# Patient Record
Sex: MALE | Race: WHITE
[De-identification: names, ages, dates, MRNs, and addresses within clinical notes are randomized per-mention and may not be internally consistent; named-entity substitution may affect disease eponyms.]

---

## 2019-01-15 ENCOUNTER — HOSPITAL ENCOUNTER (OUTPATIENT)
Dept: HOSPITAL 80 - FIMAGING | Age: 59
End: 2019-01-15
Attending: ORTHOPAEDIC SURGERY
Payer: COMMERCIAL

## 2019-01-15 DIAGNOSIS — M17.11: ICD-10-CM

## 2019-01-15 DIAGNOSIS — Z01.818: Primary | ICD-10-CM

## 2019-02-06 ENCOUNTER — HOSPITAL ENCOUNTER (OUTPATIENT)
Dept: HOSPITAL 80 - F3N | Age: 59
Setting detail: OBSERVATION
Discharge: HOME | End: 2019-02-06
Attending: ORTHOPAEDIC SURGERY | Admitting: ORTHOPAEDIC SURGERY
Payer: COMMERCIAL

## 2019-02-06 VITALS — SYSTOLIC BLOOD PRESSURE: 121 MMHG | DIASTOLIC BLOOD PRESSURE: 79 MMHG

## 2019-02-06 DIAGNOSIS — Z96.652: ICD-10-CM

## 2019-02-06 DIAGNOSIS — M17.11: Primary | ICD-10-CM

## 2019-02-06 DIAGNOSIS — Z87.891: ICD-10-CM

## 2019-02-06 PROCEDURE — 97161 PT EVAL LOW COMPLEX 20 MIN: CPT

## 2019-02-06 PROCEDURE — 73560 X-RAY EXAM OF KNEE 1 OR 2: CPT

## 2019-02-06 PROCEDURE — G0378 HOSPITAL OBSERVATION PER HR: HCPCS

## 2019-02-06 PROCEDURE — 27447 TOTAL KNEE ARTHROPLASTY: CPT

## 2019-02-06 NOTE — POSTOPPROG
Post Op Note


Date of Operation: 02/06/19


Surgeon: HAKAN Ledezma


Assistant: Gracia Ware PA-C


Anesthesiologist: dr. rahman


Anesthesia: Spinal, Other (Specify) (adductor canal block)


Pre-op Diagnosis: right knee OA


Post-op Diagnosis: same


Indication: right knee pain


Procedure:  R TKA robot assisted


Findings: severe knee OA


Inf/Abcess present in the surg proc area at time of surgery?: No


EBL:

## 2019-02-06 NOTE — PDANEPAE
ANE Past Medical History





- Cardiovascular History


Hx Hypertension: No


Hx Arrhythmias: No


Hx Chest Pain: No


Hx Coronary Artery / Peripheral Vascular Disease: No


Hx CHF / Valvular Disease: No


Hx Palpitations: No





- Pulmonary History


Hx COPD: No


Hx Asthma/Reactive Airway Disease: No


Hx Recent Upper Respiratory Infection: No


Hx Oxygen in Use at Home: No


Hx Sleep Apnea: No


Sleep Apnea Screening Result - Last Documented: Negative





- Neurologic History


Hx Cerebrovascular Accident: No


Hx Seizures: No


Hx Dementia: No





- Endocrine History


Hx Diabetes: No





- Renal History


Hx Renal Disorders: Yes


Renal History Comment: nocturia- melouk is following no issues with prostate





- Liver History


Hx Hepatic Disorders: No





- Neurological & Psychiatric Hx


Hx Neurological and Psychiatric Disorders: No





- Cancer History


Hx Cancer: No





- Congenital Disorder History


Hx Congenital Disorders: No





- GI History


Hx Gastrointestinal Disorders: No





- Other Health History


Other Health History: wears glasses for reading and driving only





- Chronic Pain History


Chronic Pain: Yes (right knee, lower back)





- Surgical History


Prior Surgeries: 12/17/13 left TKA with [place].  2013 acl reconstruction 

prior to knee replacement.  07/2013 scope to right knee.  bilateral knee scopes

- several c5-6 fusion 2002.  elbow surgery





ANE Review of Systems


Review of Systems: 








- Exercise capacity


METS (RN): 4 METS





ANE Patient History





- Allergies


Allergies/Adverse Reactions: 








No Known Allergies Allergy (Verified 01/25/19 10:26)


 








- Home Medications


Home Medications: 








Acetaminophen [Tylenol 325mg (*)] 325 mg PO DAILY PRN 01/23/19 [Last Taken 1 

Week Ago ~01/30/19]


Cholecalciferol Vit D3 [Vitamin D3 (*)] 1,000 units PO DAILY 01/23/19 [Last 

Taken 01/23/19]


Ferrous Sulfate [Ferrous Sulf 325 MG (*)] 325 mg PO DAILY 01/23/19 [Last Taken 

02/06/19 03:30]


Multivitamins [Multivitamin (*)] 1 each PO DAILY 01/23/19 [Last Taken 01/23/19]








- NPO status


NPO Since - Liquids (Date): 02/06/19


NPO Since - Liquids (Time): 04:00


NPO Since - Solids (Date): 02/05/19


NPO Since - Solids (Time): 20:00





- Smoking Hx


Smoking Status: Former smoker





- Family Anes Hx


Family Hx Anesthesia Complications: none





ANE Labs/Vital Signs





- Vital Signs


Blood Pressure: 119/82


Heart Rate: 57


Respiratory Rate: 18


O2 Sat (%): 97


Height: 172.72 cm


Weight: 77.111 kg





ANE Physical Exam





- Airway


Neck exam: FROM


Mallampati Score: Class 1


Mouth exam: normal dental/mouth exam





- Pulmonary


Pulmonary: clear to auscultation





- Cardiovascular


Cardiovascular: regular rate and rhythym





- ASA Status


ASA Status: II





ANE Anesthesia Plan


Anesthesia Plan: spinal


Regional Anesthesia: adductor canal FNB

## 2019-02-07 NOTE — GDS
[f rep st]



                                                             DISCHARGE SUMMARY





ADMISSION DIAGNOSIS:  Right knee osteoarthritis.



DISCHARGE DIAGNOSIS:  Right knee osteoarthritis.



PROCEDURE:  Right total knee arthroplasty, robotic assisted.



VTE PROPHYLAXIS:  Recommend aspirin 81 mg twice daily for 4 weeks.



BRIEF DESCRIPTION OF HOSPITAL STAY:  Patient was admitted for an elective joint arthroplasty.  The pa
yang tolerated the procedure well and has passed physical therapy.  The patient was given appropriat
e antibiotic prophylaxis and venous thromboembolism prophylaxis.  The patient's pain was well control
led on oral pain medication, patient was holding down food, and had urinated.  Decision was made to d
ischarge the patient.  The patient was given post-operative prescriptions pre-operatively.



PLAN:  To follow up as scheduled Dr. Ledezma's office on February 25th at 12:45 p.m.





Job #:  [id number]

## 2019-02-07 NOTE — GOP
[f rep st]



                                                                OPERATIVE REPORT





DATE OF OPERATION:  02/06/2019



SURGEON:  SAW Ledezma MD



ASSISTANT:  1. Parul Ledezma, PAC. 

2. FABIOLA Rollins.



ANESTHESIA:  Spinal.



PREOPERATIVE DIAGNOSIS:  Right knee osteoarthritis.



POSTOPERATIVE DIAGNOSIS:  Right knee osteoarthritis.



PROCEDURE PERFORMED:  Right total knee arthroplasty with computer navigation, robotic assist.



FINDINGS:  





ESTIMATED BLOOD LOSS:  30 cc.



INDICATIONS:  The patient is a 59-year-old male with severe and progressive pain and deformity of the
 right knee unresponsive to conservative care.  The risks and benefits of surgical intervention were 
explained in detail.



DESCRIPTION OF PROCEDURE:  The patient was brought to the operative room and placed on the table in t
he supine position. Spinal anesthesia was induced without difficulty. A pneumatic tourniquet was appl
ied about the right proximal thigh, and the leg was prepped and draped in a sterile fashion. The leg 
xie was applied. After exsanguination by elevation the tourniquet was inflated to 250 mmHg. 



Incision was made anterior medial from the tibial tuberosity to a point 2 cm proximal to the superior
 pole of the patella. Medial parapatellar arthrotomy was carried out from the superior pole of the pa
tella and posteriorly in line with the fibers of the Type II VMO. The medial collateral ligament was 
elevated and the infrapatellar fat pad was resected. 



The patella was everted and the articular surface was excised. A 35 mm patellar button was placed.  

Attention was turned first to the distal aspect of the femur.  After exposure of the femur, 2 half pi
ns were placed for fixation of the femoral array.  In a similar fashion, 2 pins were placed anteromed
ial on the tibia for fixation of the tibial array.  External land marking and registration of the hip
 center was performed without difficulty.  Internal femoral and tibial registration was carried out w
ithout difficulty and the femoral and tibial checkpoints were placed and verified for accuracy. 



Attention was turned to the femur.  The foot print for the size 5 femoral component was cut with the 
saw using the [employer] robotic system and verified for accuracy against the CT based plan.  In a similar f
ashion, the saw was used to cut the footprint for the size 6 tibial component using the JOANN system an
d verified for accuracy against the CT based plan. The tibial articular surface was excised without d
ifficulty, followed by the intercondylar box cut. 



The knee was extended and the remnants of the medial and lateral meniscus were excised. The posterior
 capsule was injected with ropivacaine, epinephrine and Toradol.  A size 6 tibial tray was positioned
. Trial reduction was then carried out. There was excellent range of motion, alignment, and stability
 using the 6 x 9 mm polyethylene. 



All trials were then removed. The joint was thoroughly irrigated and carefully dried. The press-fit c
omponents were implanted. The permanent 6 x 9 mm polyethylene was placed without difficulty. 



The tourniquet was deflated and all bleeders were coagulated. The wound was thoroughly irrigated and 
closed using interrupted sutures of 2-0 Vicryl for the joint capsule. The subcu was closed with 3-0 V
icryl and the skin with 4-0 Monocryl.  Dermabond and Steri-Strips were applied followed by a compress
charlotte dressing. The patient was then moved from the operating room to the recovery room in good conditi
on, having tolerated the procedure well.



PATHOLOGY:  Severe medial and patellofemoral osteoarthritis.





Job #:  [id number]